# Patient Record
Sex: MALE | Race: BLACK OR AFRICAN AMERICAN | NOT HISPANIC OR LATINO | Employment: FULL TIME | URBAN - METROPOLITAN AREA
[De-identification: names, ages, dates, MRNs, and addresses within clinical notes are randomized per-mention and may not be internally consistent; named-entity substitution may affect disease eponyms.]

---

## 2018-06-05 ENCOUNTER — HOSPITAL ENCOUNTER (EMERGENCY)
Facility: HOSPITAL | Age: 23
Discharge: HOME | End: 2018-06-05
Attending: EMERGENCY MEDICINE | Admitting: EMERGENCY MEDICINE

## 2018-06-05 VITALS
SYSTOLIC BLOOD PRESSURE: 150 MMHG | WEIGHT: 145.5 LBS | DIASTOLIC BLOOD PRESSURE: 76 MMHG | OXYGEN SATURATION: 98 % | TEMPERATURE: 97.9 F | HEART RATE: 71 BPM | RESPIRATION RATE: 14 BRPM

## 2018-06-05 DIAGNOSIS — R30.0 DYSURIA: Primary | ICD-10-CM

## 2018-06-05 DIAGNOSIS — N39.0 UTI (URINARY TRACT INFECTION): ICD-10-CM

## 2018-06-05 LAB
BACTERIA UR QL AUTO: ABNORMAL /HPF
BILIRUB UR QL STRIP: ABNORMAL
CLARITY UR: CLEAR
COLOR UR: YELLOW
GLUCOSE UR STRIP-MCNC: NEGATIVE MG/DL
HGB UR QL STRIP.AUTO: ABNORMAL
KETONES UR STRIP-MCNC: ABNORMAL MG/DL
LEUKOCYTE ESTERASE UR QL STRIP: ABNORMAL
MUCOUS THREADS UR QL AUTO: ABNORMAL
NITRITE UR QL STRIP: NEGATIVE
NON-SQ EPI CELLS URNS QL MICRO: ABNORMAL /HPF
OTHER STN SPEC: ABNORMAL
PH UR STRIP.AUTO: 6.5 [PH] (ref 4.5–8)
PROT UR STRIP-MCNC: ABNORMAL MG/DL
RBC #/AREA URNS AUTO: ABNORMAL /HPF
SP GR UR STRIP.AUTO: 1.02 (ref 1–1.03)
UROBILINOGEN UR QL STRIP.AUTO: 4 E.U./DL
WBC #/AREA URNS AUTO: ABNORMAL /HPF

## 2018-06-05 PROCEDURE — 87591 N.GONORRHOEAE DNA AMP PROB: CPT | Performed by: PHYSICIAN ASSISTANT

## 2018-06-05 PROCEDURE — 96372 THER/PROPH/DIAG INJ SC/IM: CPT

## 2018-06-05 PROCEDURE — 81001 URINALYSIS AUTO W/SCOPE: CPT

## 2018-06-05 PROCEDURE — 87491 CHLMYD TRACH DNA AMP PROBE: CPT | Performed by: PHYSICIAN ASSISTANT

## 2018-06-05 PROCEDURE — 99283 EMERGENCY DEPT VISIT LOW MDM: CPT

## 2018-06-05 PROCEDURE — 87086 URINE CULTURE/COLONY COUNT: CPT

## 2018-06-05 RX ORDER — CEPHALEXIN 500 MG/1
500 CAPSULE ORAL 4 TIMES DAILY
Qty: 20 CAPSULE | Refills: 0 | Status: SHIPPED | OUTPATIENT
Start: 2018-06-05 | End: 2018-06-10

## 2018-06-05 RX ORDER — AZITHROMYCIN 250 MG/1
1000 TABLET, FILM COATED ORAL ONCE
Status: COMPLETED | OUTPATIENT
Start: 2018-06-05 | End: 2018-06-05

## 2018-06-05 RX ADMIN — AZITHROMYCIN MONOHYDRATE 1000 MG: 250 TABLET ORAL at 23:25

## 2018-06-05 RX ADMIN — WATER 250 MG: 1 INJECTION INTRAMUSCULAR; INTRAVENOUS; SUBCUTANEOUS at 23:25

## 2018-06-06 LAB
CHLAMYDIA DNA CVX QL NAA+PROBE: ABNORMAL
N GONORRHOEA DNA GENITAL QL NAA+PROBE: ABNORMAL

## 2018-06-06 NOTE — DISCHARGE INSTRUCTIONS
Dysuria, Ambulatory Care   GENERAL INFORMATION:   Dysuria  is trouble urinating, or pain, burning, or discomfort when you urinate  Dysuria is usually a symptom of another problem, such as a blockage or urinary tract infection  Common symptoms include the following:   · Fever     · Cloudy, bad smelling urine     · Urge to urinate often but urinating little     · Back, side, or abdominal pain     · Blood in your urine     · Discharge that smells bad     · Itching  Seek immediate care for the following symptoms:   · Severe back, side, or abdominal pain    · A fever and shaking chills    · Vomiting several times in a row  Treatment for dysuria  may include medicines to treat a bacterial infection or help decrease bladder spasms  Manage your dysuria:   · Drink liquids as directed  Ask how much liquid to drink each day and which liquids are best for you  You may need to drink more liquid than usual to flush bacteria out of your body  · A sitz bath  may help decrease your pain  Healthcare providers may give you a portable sitz bath  This is a small tub that fits in the toilet  Fill the sitz bath or a bathtub with 4 to 6 inches of warm water  Sit in the warm water for 20 minutes 2 to 3 times a day  Follow up with your healthcare provider as directed:  Write down your questions so you remember to ask them during your visits  CARE AGREEMENT:   You have the right to help plan your care  Learn about your health condition and how it may be treated  Discuss treatment options with your caregivers to decide what care you want to receive  You always have the right to refuse treatment  The above information is an  only  It is not intended as medical advice for individual conditions or treatments  Talk to your doctor, nurse or pharmacist before following any medical regimen to see if it is safe and effective for you    © 2014 [employer] is for End User's use only and may not be sold, redistributed or otherwise used for commercial purposes  All illustrations and images included in CareNotes® are the copyrighted property of [employer]  or [employer]enas

## 2018-06-06 NOTE — ED PROVIDER NOTES
History  Chief Complaint   Patient presents with    Possible UTI     Burning on urination x 1 week     Patient presents to the emergency department today offering a 1 week history of burning with urination patient states the burning is toward the end of the stream   He denies urgency frequency  He denies abdominal pain or back pain  No history of nausea or vomiting  Patient denies penile lesions  He denies any urethral discharge  No history of fever  No ocular complaints  Patient denies pain with ejaculation  He denies pain with intercourse  None       History reviewed  No pertinent past medical history  History reviewed  No pertinent surgical history  History reviewed  No pertinent family history  I have reviewed and agree with the history as documented  Social History   Substance Use Topics    Smoking status: Current Every Day Smoker     Types: Cigarettes    Smokeless tobacco: Never Used    Alcohol use No        Review of Systems   Constitutional: Negative  HENT: Negative  Eyes: Negative  Respiratory: Negative  Cardiovascular: Negative  Gastrointestinal: Negative  Endocrine: Negative  Genitourinary: Positive for dysuria  Negative for decreased urine volume, difficulty urinating, discharge, enuresis, flank pain, frequency, genital sores, hematuria, penile pain, penile swelling, scrotal swelling, testicular pain and urgency  Musculoskeletal: Negative  Skin: Negative  Allergic/Immunologic: Negative  Neurological: Negative  Hematological: Negative  Psychiatric/Behavioral: Negative  All other systems reviewed and are negative  Physical Exam  Physical Exam   Constitutional: He is oriented to person, place, and time  He appears well-developed and well-nourished  No distress  HENT:   Head: Normocephalic  Eyes: Pupils are equal, round, and reactive to light  Neck: Normal range of motion  Cardiovascular: Normal rate and regular rhythm  Pulmonary/Chest: Effort normal and breath sounds normal  No respiratory distress  He has no wheezes  He has no rales  He exhibits no tenderness  Abdominal: Soft  There is no tenderness  Genitourinary: Penis normal    Musculoskeletal: Normal range of motion  Neurological: He is oriented to person, place, and time  Skin: Skin is warm  Capillary refill takes less than 2 seconds  He is not diaphoretic  Psychiatric: He has a normal mood and affect  Vital Signs  ED Triage Vitals [06/05/18 2254]   Temperature Pulse Respirations Blood Pressure SpO2   97 9 °F (36 6 °C) 71 14 150/76 98 %      Temp Source Heart Rate Source Patient Position - Orthostatic VS BP Location FiO2 (%)   Oral Monitor Sitting Right arm --      Pain Score       2           Vitals:    06/05/18 2254   BP: 150/76   Pulse: 71   Patient Position - Orthostatic VS: Sitting       Visual Acuity      ED Medications  Medications   cefTRIAXone (ROCEPHIN) 250 mg in sterile water IM only syringe (250 mg Intramuscular Given 6/5/18 2325)   azithromycin (ZITHROMAX) tablet 1,000 mg (1,000 mg Oral Given 6/5/18 2325)       Diagnostic Studies  Results Reviewed     Procedure Component Value Units Date/Time    Urine Microscopic [[id number]]  (Abnormal) Collected:  06/05/18 2309    Lab Status:  Final result Specimen:  Urine from Urine, Clean Catch Updated:  06/05/18 2340     RBC, UA None Seen /hpf      WBC, UA 10-20 (A) /hpf      Epithelial Cells Occasional /hpf      Bacteria, UA Occasional /hpf      OTHER OBSERVATIONS Yeast Cells Present     MUCOUS THREADS Moderate (A)    Urine culture [[id number]] Collected:  06/05/18 2309    Lab Status: In process Specimen:  Urine from Urine, Clean Catch Updated:  06/05/18 2340    Chlamydia/GC amplified DNA by PCR [[id number]] Collected:  06/05/18 2310    Lab Status:   In process Specimen:  Urine from Urine, Other Updated:  06/05/18 2313    ED Urine Macroscopic [[id number]]  (Abnormal) Collected:  06/05/18 2309    Lab Status: Final result Specimen:  Urine Updated:  06/05/18 2306     Color, UA Yellow     Clarity, UA Clear     pH, UA 6 5     Leukocytes, UA Trace (A)     Nitrite, UA Negative     Protein,  (2+) (A) mg/dl      Glucose, UA Negative mg/dl      Ketones, UA 15 (1+) (A) mg/dl      Urobilinogen, UA 4 0 (A) E U /dl      Bilirubin, UA Interference- unable to analyze (A)     Blood, UA Trace (A)     Specific [place], UA 1 025    Narrative:       CLINITEK RESULT                 No orders to display              Procedures  Procedures       Phone Contacts  ED Phone Contact    ED Course  ED Course as of Jun 05 2345   Tue Jun 05, 2018 2256 Blood Pressure: 150/76   2256 Temperature: 97 9 °F (36 6 °C)   2256 Pulse: 71   2256 Respirations: 14   2256 SpO2: 98 %   2307 Leukocytes, UA: (!) Trace   2307 Protein, UA: (!) 100 (2+)   2307 Ketones, UA: (!) 15 (1+)   2307 Urobilinogen, UA: (!) 4 0   2307 Bilirubin, UA: (!) Interference- unable to analyze   2337 Awaiting microscopic    2343 WBC, UA: (!) 10-20   2343 Bacteria, UA: Occasional   2343 MUCOUS THREADS: (!) Moderate                               MDM  CritCare Time    Disposition  Final diagnoses:   Dysuria   UTI (urinary tract infection)     Time reflects when diagnosis was documented in both MDM as applicable and the Disposition within this note     Time User Action Codes Description Comment    6/5/2018 11:06 PM Kamran GONZALEZ Add [R30 0] Dysuria     6/5/2018 11:43 PM Kamran GONZALEZ Add [N39 0] UTI (urinary tract infection)       ED Disposition     ED Disposition Condition Comment    Discharge  Eugenia Andrews discharge to home/self care      Condition at discharge: Good        Follow-up Information     Follow up With Specialties Details Why Contact Info    Barbara Ayers MD  Schedule an appointment as soon as possible for a visit  [address]  [phone]            Patient's Medications   Discharge Prescriptions    CEPHALEXIN (KEFLEX) 500 MG CAPSULE    Take 1 capsule (500 mg total) by mouth 4 (four) times a day for 5 days       Start Date: 6/5/2018  End Date: 6/10/2018       Order Dose: 500 mg       Quantity: 20 capsule    Refills: 0     No discharge procedures on file      ED Provider  Electronically Signed by           Merry Zaldivar PA-C  06/05/18 0607

## 2018-06-07 LAB — BACTERIA UR CULT: NORMAL

## 2020-11-19 ENCOUNTER — APPOINTMENT (OUTPATIENT)
Dept: RADIOLOGY | Age: 25
End: 2020-11-19
Payer: OTHER MISCELLANEOUS

## 2020-11-19 ENCOUNTER — OCCMED (OUTPATIENT)
Dept: URGENT CARE | Age: 25
End: 2020-11-19
Payer: OTHER MISCELLANEOUS

## 2020-11-19 DIAGNOSIS — R52 PAIN: ICD-10-CM

## 2020-11-19 DIAGNOSIS — R52 PAIN: Primary | ICD-10-CM

## 2020-11-19 PROCEDURE — 72100 X-RAY EXAM L-S SPINE 2/3 VWS: CPT

## 2020-11-19 PROCEDURE — 99283 EMERGENCY DEPT VISIT LOW MDM: CPT | Performed by: PHYSICIAN ASSISTANT

## 2020-11-19 PROCEDURE — G0382 LEV 3 HOSP TYPE B ED VISIT: HCPCS | Performed by: PHYSICIAN ASSISTANT

## 2020-11-27 ENCOUNTER — APPOINTMENT (OUTPATIENT)
Dept: URGENT CARE | Age: 25
End: 2020-11-27
Payer: OTHER MISCELLANEOUS

## 2020-11-27 PROCEDURE — 99213 OFFICE O/P EST LOW 20 MIN: CPT | Performed by: PHYSICIAN ASSISTANT
